# Patient Record
Sex: FEMALE | Race: WHITE
[De-identification: names, ages, dates, MRNs, and addresses within clinical notes are randomized per-mention and may not be internally consistent; named-entity substitution may affect disease eponyms.]

---

## 2017-11-07 ENCOUNTER — HOSPITAL ENCOUNTER (OUTPATIENT)
Dept: HOSPITAL 80 - FIMAGING | Age: 62
End: 2017-11-07
Attending: INTERNAL MEDICINE
Payer: COMMERCIAL

## 2017-11-07 DIAGNOSIS — M81.0: ICD-10-CM

## 2017-11-07 DIAGNOSIS — Z13.820: ICD-10-CM

## 2017-11-07 DIAGNOSIS — Z12.31: Primary | ICD-10-CM

## 2017-11-07 PROCEDURE — G0202 SCR MAMMO BI INCL CAD: HCPCS

## 2018-09-01 ENCOUNTER — HOSPITAL ENCOUNTER (EMERGENCY)
Dept: HOSPITAL 80 - FED | Age: 63
Discharge: HOME | End: 2018-09-01
Payer: COMMERCIAL

## 2018-09-01 VITALS — DIASTOLIC BLOOD PRESSURE: 62 MMHG | SYSTOLIC BLOOD PRESSURE: 120 MMHG

## 2018-09-01 DIAGNOSIS — S61.219A: Primary | ICD-10-CM
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
## 2018-09-01 PROCEDURE — 0HQGXZZ REPAIR LEFT HAND SKIN, EXTERNAL APPROACH: ICD-10-PCS

## 2018-09-01 NOTE — EDPHY
General


Time Seen by Provider: 09/01/18 14:02


Narrative: 





CHIEF COMPLAINT: 


"I cut my finger"





HISTORY OF PRESENT ILLNESS: 


Patient presents with complaints of laceration to her left index finger.  She 

says she was cutting chives outside in her garden when she accidentally cut her 

left finger.  This was done with a knife.  Bleeding was moderate but stopped 

with pressure.  She has no numbness or tingling.  No difficulty bending or 

straightening the finger.  She reports tetanus up-to-date less than 5 years 

ago.  No other associated complaints or modifying factors.  Right-hand dominant.





TIME OF INJURY:


2 hr prior to arrival





TETANUS STATUS:


Less than 5 years ago





MEDICAL/SURGICAL/SOCIAL HISTORY:


Uncomplicated





REVIEW OF SYSTEMS:


Ten systems reviewed and are negative unless otherwise noted in the HPI





EXAMINATION


General Appearance:  Alert, no distress


Head: normocephalic, atraumatic


Cardiovascular:  Symmetric radial pulses.  Brisk cap refill the fingers the 

affected hand.


Neurological:  A&O, light and 2 point sensory symmetric in the hands.  

Interossei strength symmetric.


Skin:  Warm and dry, no rash.  2.5 cm laceration on the left index finger over 

the middle phalanx, radial side.  No underlying tendon exposure.  No pulsatile 

bleeding


Extremities:  Tenderness over the left index finger laceration.  Flexion 

extension of the finger retain including superficialis and profundus.





DIFFERENTIAL DIAGNOSES:


Including but not limited to laceration, laceration complication, laceration 

with tendon injury








MDM:


2:05 p.m.


Laceration to the left index finger over the radial side without any evidence 

of tendon injury.  She is neuro intact distally.  Full flexion extension.  

Tetanus up-to-date.  I have administered a digital block.  We will irrigate the 

wound.  I will close this primarily.





2:50 p.m.


Laceration has been repaired without difficulty.  Neuro intact pre and 

postprocedure with good flexion extension of the finger including superficialis 

and profundus flexors.  Wound care discussed.  Dressing has been applied.  We 

discussed follow up here in 7-10 days for suture removal.  We discussed signs 

symptoms infection.  She is discharged home stable condition.





PROCEDURE: Laceration repair


Consent:  Verbal


Location:  Left index finger


Length of repair:  2.5 cm


Complexity:  Complex


Layer involvement:  Single


Anesthesia:  Digital block


Irrigation:  Extensive


Debridement:  None


Procedure description:  Following good anesthesia, the wound was copiously 

irrigated.  Wound bed was explored with a sterile glove, and there is no 

foreign body noted.  Wound borders were approximated well with good hemostasis.

  Tolerated well without complication.


Suture/Staple material:  5-0 Prolene, 4 simple ruptured sutures


Wound care:  Routine as discussed


Suture/Staple removal: 7-10  Days





PROCEDURE: Digital Block


Indication: Finger laceration


Consent:  Verbal


Location: Left index finger


Anesthesia: Lidocaine 1% plain, 0.25% Marcaine plain, 5mL


Description:  Base of the finger was prepped.  The above was infused without 

difficulty.  Tolerated well.  Good anesthesia.


Complications: None








SUPERVISION:


This patient was independently evaluated without direct involvement of or 

examination by the attending physician. 








ED Precautions: 


Worsening pain. Erythema, edema, cyanosis, pallor, paresthesia or anesthesia.











- History


Smoking Status: Never smoked





- Objective


Vital Signs: 


 Initial Vital Signs











Temperature (C)  97.9 F   09/01/18 13:58


 


Heart Rate  59 L  09/01/18 13:58


 


Respiratory Rate  18   09/01/18 13:58


 


Blood Pressure  133/78 H  09/01/18 13:58


 


O2 Sat (%)  96   09/01/18 13:58








 











O2 Delivery Mode               Room Air














Allergies/Adverse Reactions: 


 





azithromycin [From Zithromax] Allergy (Verified 09/01/18 13:58)


 








Home Medications: 














 Medication  Instructions  Recorded


 


Synthroid    08/14/14


 


Cephalexin [Keflex (*)] 500 mg PO QID #28 cap 09/01/18














Departure





- Departure


Disposition: Home, Routine, Self-Care


Clinical Impression: 


Finger laceration


Qualifiers:


 Encounter type: initial encounter Finger: index finger Damage to nail status: 

without damage Foreign body presence: without foreign body Laterality: left 

Qualified Code(s): S61.211A - Laceration without foreign body of left index 

finger without damage to nail, initial encounter





Condition: Good


Instructions:  Care For Your Stitches (ED), Laceration (ED)


Additional Instructions: 


1. Thin layer of bacitracin once daily for the next 2 days


2. Keep the wound covered while showering for the next 3 days


3. Daily wound care as discussed


4. Return here for suture removal in 7 days


5. Return here for signs of infection as discussed including warmth, redness, 

fever, drainage from the site


6. return here for increasing pain surrounding the laceration


7. Do not submerge the wound in any water, hot tub, swimming pool until sutures 

removed


Referrals: 


Tulio Dunaway MD [Primary Care Provider] - As per Instructions


Physician,Emergency Dept, MD [Medical Doctor] - As per Instructions (Seven-ten 

days for suture removal)


Prescriptions: 


Cephalexin [Keflex (*)] 500 mg PO QID #28 cap

## 2018-10-23 ENCOUNTER — HOSPITAL ENCOUNTER (OUTPATIENT)
Dept: HOSPITAL 80 - FIMAGING | Age: 63
End: 2018-10-23
Attending: INTERNAL MEDICINE
Payer: COMMERCIAL

## 2018-10-23 DIAGNOSIS — M79.89: ICD-10-CM

## 2018-10-23 DIAGNOSIS — M79.645: Primary | ICD-10-CM

## 2018-11-29 ENCOUNTER — HOSPITAL ENCOUNTER (OUTPATIENT)
Dept: HOSPITAL 80 - FIMAGING | Age: 63
End: 2018-11-29
Attending: INTERNAL MEDICINE
Payer: COMMERCIAL

## 2018-11-29 DIAGNOSIS — M53.82: Primary | ICD-10-CM

## 2018-11-29 DIAGNOSIS — M50.323: ICD-10-CM

## 2018-12-04 ENCOUNTER — HOSPITAL ENCOUNTER (OUTPATIENT)
Dept: HOSPITAL 80 - FIMAGING | Age: 63
End: 2018-12-04
Attending: INTERNAL MEDICINE
Payer: COMMERCIAL

## 2018-12-04 DIAGNOSIS — Z12.31: Primary | ICD-10-CM

## 2018-12-21 ENCOUNTER — HOSPITAL ENCOUNTER (OUTPATIENT)
Dept: HOSPITAL 80 - FIMAGING | Age: 63
End: 2018-12-21
Attending: INTERNAL MEDICINE
Payer: COMMERCIAL

## 2018-12-21 DIAGNOSIS — R92.8: Primary | ICD-10-CM

## 2019-03-21 ENCOUNTER — HOSPITAL ENCOUNTER (OUTPATIENT)
Dept: HOSPITAL 80 - BMCIMAGING | Age: 64
End: 2019-03-21
Payer: COMMERCIAL

## 2019-03-21 DIAGNOSIS — Z13.820: Primary | ICD-10-CM

## 2019-03-21 DIAGNOSIS — M81.0: ICD-10-CM
